# Patient Record
Sex: FEMALE | NOT HISPANIC OR LATINO | Employment: FULL TIME | URBAN - METROPOLITAN AREA
[De-identification: names, ages, dates, MRNs, and addresses within clinical notes are randomized per-mention and may not be internally consistent; named-entity substitution may affect disease eponyms.]

---

## 2017-05-10 ENCOUNTER — OFFICE VISIT - HEALTHEAST (OUTPATIENT)
Dept: FAMILY MEDICINE | Facility: CLINIC | Age: 42
End: 2017-05-10

## 2017-05-10 DIAGNOSIS — Z00.00 ROUTINE GENERAL MEDICAL EXAMINATION AT A HEALTH CARE FACILITY: ICD-10-CM

## 2017-05-10 DIAGNOSIS — Z30.011 ORAL CONTRACEPTION INITIATION: ICD-10-CM

## 2017-05-10 LAB
HIV 1+2 AB+HIV1 P24 AG SERPL QL IA: NEGATIVE
LDLC SERPL CALC-MCNC: 102 MG/DL

## 2017-05-10 ASSESSMENT — MIFFLIN-ST. JEOR: SCORE: 1343.06

## 2017-05-11 LAB
HBV SURFACE AG SERPL QL IA: NEGATIVE
SYPHILIS RPR SCREEN - HISTORICAL: NORMAL

## 2017-05-15 LAB
HPV INTERPRETATION - HISTORICAL: NORMAL
HPV INTERPRETER - HISTORICAL: NORMAL

## 2017-05-17 LAB
BKR LAB AP ABNORMAL BLEEDING: NO
BKR LAB AP BIRTH CONTROL/HORMONES: NORMAL
BKR LAB AP CERVICAL APPEARANCE: NORMAL
BKR LAB AP GYN ADEQUACY: NORMAL
BKR LAB AP GYN INTERPRETATION: NORMAL
BKR LAB AP HPV REFLEX: NORMAL
BKR LAB AP LMP: NORMAL
BKR LAB AP PATIENT STATUS: NORMAL
BKR LAB AP PREVIOUS ABNORMAL: 2010
BKR LAB AP PREVIOUS NORMAL: 2013
HIGH RISK?: NO
PATH REPORT.COMMENTS IMP SPEC: NORMAL
RESULT FLAG (HE HISTORICAL CONVERSION): NORMAL

## 2017-10-20 ENCOUNTER — COMMUNICATION - HEALTHEAST (OUTPATIENT)
Dept: FAMILY MEDICINE | Facility: CLINIC | Age: 42
End: 2017-10-20

## 2018-01-05 ENCOUNTER — RECORDS - HEALTHEAST (OUTPATIENT)
Dept: ADMINISTRATIVE | Facility: OTHER | Age: 43
End: 2018-01-05

## 2018-02-08 ENCOUNTER — COMMUNICATION - HEALTHEAST (OUTPATIENT)
Dept: FAMILY MEDICINE | Facility: CLINIC | Age: 43
End: 2018-02-08

## 2018-02-08 DIAGNOSIS — Z30.011 ORAL CONTRACEPTION INITIATION: ICD-10-CM

## 2018-05-02 ENCOUNTER — COMMUNICATION - HEALTHEAST (OUTPATIENT)
Dept: FAMILY MEDICINE | Facility: CLINIC | Age: 43
End: 2018-05-02

## 2018-05-02 DIAGNOSIS — Z30.011 ORAL CONTRACEPTION INITIATION: ICD-10-CM

## 2018-05-03 ENCOUNTER — COMMUNICATION - HEALTHEAST (OUTPATIENT)
Dept: FAMILY MEDICINE | Facility: CLINIC | Age: 43
End: 2018-05-03

## 2018-05-04 ENCOUNTER — OFFICE VISIT - HEALTHEAST (OUTPATIENT)
Dept: FAMILY MEDICINE | Facility: CLINIC | Age: 43
End: 2018-05-04

## 2018-05-04 DIAGNOSIS — H93.13 TINNITUS OF BOTH EARS: ICD-10-CM

## 2018-06-25 ENCOUNTER — OFFICE VISIT - HEALTHEAST (OUTPATIENT)
Dept: FAMILY MEDICINE | Facility: CLINIC | Age: 43
End: 2018-06-25

## 2018-06-25 DIAGNOSIS — H93.13 TINNITUS, BILATERAL: ICD-10-CM

## 2018-06-25 DIAGNOSIS — R42 VERTIGO: ICD-10-CM

## 2018-06-25 DIAGNOSIS — J30.2 SEASONAL ALLERGIC RHINITIS: ICD-10-CM

## 2018-06-25 DIAGNOSIS — I47.10 SVT (SUPRAVENTRICULAR TACHYCARDIA) (H): ICD-10-CM

## 2018-06-25 RX ORDER — CETIRIZINE HYDROCHLORIDE 10 MG/1
10 TABLET ORAL DAILY
Qty: 30 TABLET | Refills: 2 | Status: SHIPPED | OUTPATIENT
Start: 2018-06-25 | End: 2022-02-01

## 2018-06-25 RX ORDER — MECLIZINE HYDROCHLORIDE 25 MG/1
25 TABLET ORAL 3 TIMES DAILY PRN
Qty: 30 TABLET | Refills: 0 | Status: SHIPPED | OUTPATIENT
Start: 2018-06-25 | End: 2022-02-01

## 2018-06-26 ENCOUNTER — RECORDS - HEALTHEAST (OUTPATIENT)
Dept: ADMINISTRATIVE | Facility: OTHER | Age: 43
End: 2018-06-26

## 2018-07-24 ENCOUNTER — COMMUNICATION - HEALTHEAST (OUTPATIENT)
Dept: SCHEDULING | Facility: CLINIC | Age: 43
End: 2018-07-24

## 2018-07-25 ENCOUNTER — OFFICE VISIT - HEALTHEAST (OUTPATIENT)
Dept: FAMILY MEDICINE | Facility: CLINIC | Age: 43
End: 2018-07-25

## 2018-07-25 ENCOUNTER — RECORDS - HEALTHEAST (OUTPATIENT)
Dept: GENERAL RADIOLOGY | Facility: CLINIC | Age: 43
End: 2018-07-25

## 2018-07-25 DIAGNOSIS — M54.2 CERVICALGIA: ICD-10-CM
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
## 2018-08-06 ENCOUNTER — COMMUNICATION - HEALTHEAST (OUTPATIENT)
Dept: FAMILY MEDICINE | Facility: CLINIC | Age: 43
End: 2018-08-06

## 2018-08-06 DIAGNOSIS — Z30.011 ORAL CONTRACEPTION INITIATION: ICD-10-CM

## 2018-08-06 RX ORDER — NORGESTIMATE AND ETHINYL ESTRADIOL 7DAYSX3 28
1 KIT ORAL DAILY
Qty: 3 PACKAGE | Refills: 3 | Status: SHIPPED | OUTPATIENT
Start: 2018-08-06 | End: 2022-01-03

## 2021-05-30 VITALS — WEIGHT: 146.7 LBS | HEIGHT: 67 IN | BODY MASS INDEX: 23.02 KG/M2

## 2021-06-01 VITALS — BODY MASS INDEX: 22.4 KG/M2 | WEIGHT: 143 LBS

## 2021-06-01 VITALS — WEIGHT: 144.7 LBS | BODY MASS INDEX: 22.66 KG/M2

## 2021-06-01 VITALS — BODY MASS INDEX: 23.09 KG/M2 | WEIGHT: 147.4 LBS

## 2021-06-03 ENCOUNTER — RECORDS - HEALTHEAST (OUTPATIENT)
Dept: ADMINISTRATIVE | Facility: CLINIC | Age: 46
End: 2021-06-03

## 2021-06-10 NOTE — PROGRESS NOTES
Assessment:     1. Routine general medical examination at a health care facility  - LDL Cholesterol, Direct  - Comprehensive Metabolic Panel  - Gynecologic Cytology (PAP Smear)  - HIV Antigen/Antibody Screening Cascade  - Chlamydia trachomatis & Neisseria gonorrhoeae, Amplified Detection  - Hepatitis B Surface Antigen (HBsAG)  - Syphilis Screen, Cascade    2. Oral contraception initiation  - Pregnancy, Urine  - norgestimate-ethinyl estradiol (TRI-SPRINTEC, 28,) 0.18/0.215/0.25 mg-35 mcg (28) Tab tablet; Take 1 tablet by mouth daily.  Dispense: 3 Package; Refill: 2       Plan:       All questions answered.  Await pap smear results.  Blood tests: Comprehensive metabolic panel, Lipoproteins, Total cholesterol and STD screening.  Chlamydia specimen.  Contraception: OCP (estrogen/progesterone).  Discussed healthy lifestyle modifications.  Follow up as needed.   Full counseling done with regards to complications and side effects of OCPs.  Also discussed sexual health and STD screening.  Her questions were answered.  We also discussed the Pap smear as well as HPV.  She has not done any mammograms in the past and I did encourage her to get one.  She was given a flyer to call and make an appointment for the mammogram.  Subjective:      Rola Velasquez is a 42 y.o. female who presents for an annual exam. The patient is sexually active. The patient participates in regular exercise: yes. The patient reports that there is not domestic violence in her life. Comes for a full female physical as well as to discuss contraception.Has been using condom in the past and that been good for her.She also wants to have STD screening done.  Her last Pap was in 2013 though she thinks she has had it done a lot sooner.    Healthy Habits:   Regular Exercise: Yes  Sunscreen Use: Yes and N/A  Healthy Diet: Yes  Dental Visits Regularly: Yes  Seat Belt: Yes  Sexually active: Yes  Self Breast Exam Monthly:Yes  Hemoccults: N/A  Flex Sig:  N/A  Colonoscopy: N/A  Lipid Profile: Yes  Glucose Screen: Yes  Prevention of Osteoporosis: Yes  Last Dexa: N/A  Guns at Home:  N/A      Immunization History   Administered Date(s) Administered     Influenza, Seasonal, Inj PF 10/14/2013     Tdap 03/04/2013     Immunization status: up to date and documented.    No exam data present    Gynecologic History  Patient's last menstrual period was 04/23/2017 (exact date).  Contraception: condoms  Last Pap: 2013. Results were: normal  Last mammogram: NONE YET.     OB History   No data available       Current Outpatient Prescriptions   Medication Sig Dispense Refill     CALCIUM CARBONATE/VITAMIN D3 (CALCIUM+D ORAL)        CYANOCOBALAMIN, VITAMIN B-12, (VITAMIN B12 ORAL) Take by mouth.       protein Powd Take by mouth.       No current facility-administered medications for this visit.      No past medical history on file.  No past surgical history on file.  Azithromycin  No family history on file.  Social History     Social History     Marital status:      Spouse name: N/A     Number of children: N/A     Years of education: N/A     Occupational History     Not on file.     Social History Main Topics     Smoking status: Never Smoker     Smokeless tobacco: Not on file     Alcohol use Not on file     Drug use: Not on file     Sexual activity: Not on file     Other Topics Concern     Not on file     Social History Narrative       Review of Systems  General:  Denies problem  Eyes: Denies problem  Ears/Nose/Throat: Denies problem  Cardiovascular: Denies problem,though has some high heart rate intermittently.  Respiratory:  Denies problem  Gastrointestinal:  Denies problem, Genitourinary: Denies problem  Musculoskeletal:  Denies problem  Skin: Denies problem  Neurologic: Denies problem  Psychiatric: Denies problem  Endocrine: Denies problem  Heme/Lymphatic: Denies problem   Allergic/Immunologic: Denies problem        Objective:         Vitals:    05/10/17 0857   BP: 100/64  "  Pulse: 88   Weight: 146 lb 11.2 oz (66.5 kg)   Height: 5' 7\" (1.702 m)     Body mass index is 22.98 kg/(m^2).    Physical Exam:  General Appearance: Alert, cooperative, no distress, appears stated age  Head: Normocephalic, without obvious abnormality, atraumatic  Eyes: PERRL, conjunctiva/corneas clear, EOM's intact  Ears: Normal TM's and external ear canals, both ears  Nose: Nares normal, septum midline,mucosa normal, no drainage  Throat: Lips, mucosa, and tongue normal; teeth and gums normal  Neck: Supple, symmetrical, trachea midline, no adenopathy;  thyroid: not enlarged, symmetric.  Back: Symmetric, no curvature, ROM normal, no CVA tenderness  Lungs: Clear to auscultation bilaterally, respirations unlabored  Heart: Regular rate and rhythm, S1 and S2 normal, no murmur, rub, or gallop,  Abdomen: Soft, non-tender, bowel sounds active all four quadrants,  no masses, no organomegaly  Pelvic:Normally developed genitalia with no external lesions or eruptions. Vagina show no lesions, inflammation, discharge or tenderness.Cervix with normal os,and hypogpimented lesions ?cervical cysts.   Uterus :normal adnexal mass or tenderness.  Extremities: Extremities normal, atraumatic, no cyanosis or edema  Skin: Skin color, texture, turgor normal, no rashes or lesions  Lymph nodes: Cervical, supraclavicular, and axillary nodes normal  Neurologic: Normal        "

## 2021-06-16 PROBLEM — I47.10 SVT (SUPRAVENTRICULAR TACHYCARDIA) (H): Status: ACTIVE | Noted: 2018-06-25

## 2021-06-17 NOTE — PROGRESS NOTES
Assessment/Plan:      1. Tinnitus of both ears  She has had Tinnitus before now.There is no signs of infection and she was reassured. She will inform us if there is any worsening or pain to the ears.Questions were answered.      Subjective:    Patient ID: Rola Velasquez is a 43 y.o. female.    HPI Comments: Rola Velasquez 43-year-old female who comes in today with concerns of having tinnitus which is a bilateral.  Noted that this has been going on for the past 3 weeks.  She did have an episode of tinnitus in the past noting that that was associated with infection though she did not have any ear pain.  She wants to come in to have it checked out in order to make sure that she does not have any infection.  She does have mild runny nose, no cough no chest pain or shortness of breath.      The following portions of the patient's history were reviewed and updated as appropriate: allergies, current medications, past family history, past medical history, past social history, past surgical history and problem list.    Review of Systems   Constitutional: Negative.    HENT: Negative.  Negative for hearing loss, sinus pain, sinus pressure and sore throat.    Respiratory: Negative for cough, chest tightness and wheezing.    Cardiovascular: Negative.    Musculoskeletal: Negative for neck pain and neck stiffness.   Neurological: Negative for dizziness, light-headedness and headaches.     Vitals:    05/04/18 1638   BP: 110/66   Patient Site: Left Arm   Patient Position: Sitting   Cuff Size: Adult Regular   Pulse: 88   Temp: 98  F (36.7  C)   TempSrc: Oral   Weight: 143 lb (64.9 kg)             Objective:    Physical Exam   Constitutional: She appears well-developed and well-nourished.   HENT:   Right Ear: A middle ear effusion is present.   Left Ear: A middle ear effusion is present.   Nose: Rhinorrhea present.   Mouth/Throat: Oropharynx is clear and moist.   Very minimal ear effusion. Minimal rhinorrhea.    Neck: Normal range of motion.   Cardiovascular: Normal rate and regular rhythm.    Pulmonary/Chest: Effort normal and breath sounds normal.

## 2021-06-18 NOTE — PROGRESS NOTES
"Subjective:      Patient ID: Rola Velasquez is a 43 y.o. female.    Chief Complaint:   Chief Complaint   Patient presents with     Ear Pain     Sx. started in 1/2018, poss sinus infection, vertigo, ring in ear's         HPI Tinnitus since January, high-pitched, \"loud\", briefly will go away only very occasionally, constant, non-pulsatile when present.  No alleviating factors. Saw ENT in January who said she had mild fluid behind the ears with nml audiology testing - see note under media tab.  Thought poss related to jaw clenching.  Patient wears a mouthguard at night.  Can also hear heartbeat in ears starting this weekend.    Vertigo: Room-spinning starting 2 days ago.  Had similar episode in January/Feb, only previous episode was worse.  Worse with turning head side to side both directions.  No vomiting.  Hasn't tried anything.  Not having symptoms at this moment.  Laid down to go bed last night and room started spinning.  Last x 1 min.      Allergies: Has allergies to mold, dust and \"everything airborne.\"  Takes Benadryl; improved following allergy injections, but still takes Benadryl frequently which causes sleepiness.    Avoid Sudafed due to SVT.    Ear:  Feels \"stuffy.\" Uncomfortable. Gets frequent ear infections.      Sinuses: No facial/head pain.  Used Neti pot this morning with some relief.  States sinuses are \"always\" this way. No purulent drainage. Wonders if sinuses are infected and causing sx.      Had a ENT appointment scheduled for tomorrow at [facility], but appointment had to be canceled because provider scheduled surgery.  Works as an .    No past medical history on file.    No past surgical history on file.    No family history on file.    Social History   Substance Use Topics     Smoking status: Never Smoker     Smokeless tobacco: Never Used     Alcohol use None       Review of Systems   Constitutional: Negative for chills and fatigue.   HENT: Positive for congestion, dental " "problem (teeth clenching), postnasal drip, rhinorrhea and tinnitus. Negative for ear discharge, ear pain (Feels \"uncomfortable.\" ), facial swelling, sinus pain, sinus pressure, sore throat and voice change.    Eyes: Negative for discharge, redness, itching and visual disturbance.   Respiratory: Negative for cough and shortness of breath.    Cardiovascular: Negative for chest pain and palpitations.   Endocrine:        Excessive hair growth. \"I think I have a hormonal problem.\"    Musculoskeletal: Negative for myalgias.   Skin: Negative for color change.       Objective:     /70 (Patient Site: Right Arm, Patient Position: Sitting, Cuff Size: Adult Regular)  Pulse 99  Temp 98.1  F (36.7  C) (Oral)   Resp 18  Wt 144 lb 11.2 oz (65.6 kg)  LMP 06/25/2018  SpO2 98%  BMI 22.66 kg/m2    Physical Exam   Constitutional: She appears well-developed and well-nourished.   HENT:   Right Ear: External ear normal. Tympanic membrane is not perforated, not erythematous, not retracted and not bulging. No middle ear effusion.   Left Ear: External ear normal. Tympanic membrane is not perforated, not erythematous, not retracted and not bulging.  No middle ear effusion.   Nose: No rhinorrhea or sinus tenderness. Right sinus exhibits no maxillary sinus tenderness and no frontal sinus tenderness. Left sinus exhibits no maxillary sinus tenderness and no frontal sinus tenderness.   Mouth/Throat: Uvula is midline. Posterior oropharyngeal erythema present. No oropharyngeal exudate or tonsillar abscesses.   Normal locations of cone of light   Eyes: Pupils are equal, round, and reactive to light. Right eye exhibits no discharge. Left eye exhibits no discharge. Right eye exhibits normal extraocular motion and no nystagmus. Left eye exhibits normal extraocular motion and no nystagmus.   Cardiovascular: Normal rate, regular rhythm, S1 normal and S2 normal.    No murmur heard.  Neurological: She is alert. She has normal strength. No " cranial nerve deficit or sensory deficit. She displays a negative Romberg sign.       Assessment:     Procedures    The primary encounter diagnosis was Vertigo. Diagnoses of Seasonal allergic rhinitis, Tinnitus, bilateral, and SVT (supraventricular tachycardia) (H) were also pertinent to this visit.    Plan:     Diagnoses and all orders for this visit:    Vertigo  -     meclizine (ANTIVERT) 25 mg tablet; Take 1 tablet (25 mg total) by mouth 3 (three) times a day as needed for dizziness or nausea.  Dispense: 30 tablet; Refill: 0    Seasonal allergic rhinitis  -     cetirizine (ZYRTEC) 10 MG tablet; Take 1 tablet (10 mg total) by mouth daily.  Dispense: 30 tablet; Refill: 2    Tinnitus, bilateral    SVT (supraventricular tachycardia) (H)    Given contact information for Dr. Dheeraj Ely who saw her in January.  Exercise caution while driving.  Zyrtec once a day.  Informed may make her a little sleepy.  Stop Benadryl if taking Zyrtec.      Suspect patient could have Ménière's disease given vertigo, tinnitus, and feeling of fluid behind her ears.  Patient informed that specialist would diagnose this condition if she had it.  Patient could also need an MRI at this point due to the ongoing nature of symptoms for 6 months.  Neuro exam today was completely negative, including a negative Romberg.      No indication for antibiotics for sinuses nor ears at this time.
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
## 2021-08-15 ENCOUNTER — HEALTH MAINTENANCE LETTER (OUTPATIENT)
Age: 46
End: 2021-08-15

## 2021-10-11 ENCOUNTER — HEALTH MAINTENANCE LETTER (OUTPATIENT)
Age: 46
End: 2021-10-11

## 2021-11-17 ENCOUNTER — HOSPITAL ENCOUNTER (OUTPATIENT)
Facility: CLINIC | Age: 46
End: 2021-11-17
Attending: PLASTIC SURGERY | Admitting: PLASTIC SURGERY
Payer: COMMERCIAL

## 2021-11-17 DIAGNOSIS — Z11.59 ENCOUNTER FOR SCREENING FOR OTHER VIRAL DISEASES: ICD-10-CM

## 2021-12-23 NOTE — PROGRESS NOTES
Assessment & Plan     Adverse effect of COVID-19 vaccine  Patient is new to me.  Patient is 42-year-old female with history of SVT, tinnitus and multiple allergies presented to the clinic for COVID-19 vaccination counseling.  Patient states she received 2 shots of COVID-19 on 03/05/2021 and 03/26/2021 in [place].  The vaccine was given in vaccine site in the parking lot not in hospital setting.  Patient states that she had reaction from the vaccine.  She reports facial numbness (likely left side) started 10 to 15 minutes after the shot, she also had difficulty swallowing.  Patient also reports leg numbness.  Patient was monitored at the vaccine site for additional 30 minutes, no EpiPen was given and subsequently she was discharged home.  Because of this experience she states she needs to be monitored during the vaccine administration.  Patient is not sure if this reaction occurred after the first of the second shot.  No current symptoms today.  Discussed with patient her concern.  It is unclear what kind of reaction she had.  Based on the information provided the patient, I recommended obtaining COVID-19 antibodies and referral to allergy specialist for clearance to give the booster shot of COVID-19.  In the meantime advised the patient to exercise precaution to avoid COVID-19 infection.  - Adult Allergy/Asthma Referral; Future  - COVID-19 dEuardo RBD Kasie & Titer Reflex; Future  - SARS-CoV-2 Nucleocapsid Total Ab; Future  - COVID-19 Eduardo RBD Kasie & Titer Reflex  - SARS-CoV-2 Nucleocapsid Total Ab      Patient verbalized understanding and agreed on the plan of care. All questions answered.                  Return in about 4 weeks (around 1/21/2022), or if symptoms worsen or fail to improve, for Follow up.    Charles Matson MD  [facility]   Rola is a 46 year old who presents for the following health issues     HPI     -Discuss COVID Vaccine       Review of Systems  "  Constitutional, HEENT, cardiovascular, pulmonary, gi and gu systems are negative, except as otherwise noted.      Objective    /83 (BP Location: Left arm, Patient Position: Sitting, Cuff Size: Adult Regular)   Pulse 88   Temp (!) 96.7  F (35.9  C) (Tympanic)   Ht 1.702 m (5' 7\")   Wt 62.6 kg (138 lb)   SpO2 97%   BMI 21.61 kg/m    Body mass index is 21.61 kg/m .  Physical Exam  Vitals and nursing note reviewed.   Constitutional:       General: She is not in acute distress.     Appearance: Normal appearance. She is not ill-appearing, toxic-appearing or diaphoretic.   Neurological:      Mental Status: She is alert.                        "

## 2021-12-24 ENCOUNTER — OFFICE VISIT (OUTPATIENT)
Dept: FAMILY MEDICINE | Facility: CLINIC | Age: 46
End: 2021-12-24
Payer: COMMERCIAL

## 2021-12-24 VITALS
SYSTOLIC BLOOD PRESSURE: 124 MMHG | HEIGHT: 67 IN | OXYGEN SATURATION: 97 % | HEART RATE: 88 BPM | WEIGHT: 138 LBS | DIASTOLIC BLOOD PRESSURE: 83 MMHG | BODY MASS INDEX: 21.66 KG/M2 | TEMPERATURE: 96.7 F

## 2021-12-24 DIAGNOSIS — T50.B95A ADVERSE EFFECT OF COVID-19 VACCINE: Primary | ICD-10-CM

## 2021-12-24 PROCEDURE — 36415 COLL VENOUS BLD VENIPUNCTURE: CPT | Performed by: FAMILY MEDICINE

## 2021-12-24 PROCEDURE — 99000 SPECIMEN HANDLING OFFICE-LAB: CPT | Performed by: FAMILY MEDICINE

## 2021-12-24 PROCEDURE — 86769 SARS-COV-2 COVID-19 ANTIBODY: CPT | Mod: 90 | Performed by: FAMILY MEDICINE

## 2021-12-24 PROCEDURE — 99203 OFFICE O/P NEW LOW 30 MIN: CPT | Performed by: FAMILY MEDICINE

## 2021-12-24 ASSESSMENT — MIFFLIN-ST. JEOR: SCORE: 1303.59

## 2021-12-24 ASSESSMENT — PAIN SCALES - GENERAL: PAINLEVEL: NO PAIN (0)

## 2021-12-26 LAB — SARS-COV-2 AB SERPL QL IA: NEGATIVE

## 2021-12-27 LAB
SARS-COV-2 AB SERPL IA-ACNC: 221 U/ML
SARS-COV-2 AB SERPL QL IA: POSITIVE

## 2022-01-03 ENCOUNTER — OFFICE VISIT (OUTPATIENT)
Dept: FAMILY MEDICINE | Facility: CLINIC | Age: 47
End: 2022-01-03
Payer: COMMERCIAL

## 2022-01-03 VITALS
BODY MASS INDEX: 22.63 KG/M2 | WEIGHT: 144.2 LBS | TEMPERATURE: 98.2 F | HEIGHT: 67 IN | DIASTOLIC BLOOD PRESSURE: 70 MMHG | OXYGEN SATURATION: 99 % | HEART RATE: 91 BPM | SYSTOLIC BLOOD PRESSURE: 106 MMHG

## 2022-01-03 DIAGNOSIS — Z11.3 SCREEN FOR STD (SEXUALLY TRANSMITTED DISEASE): ICD-10-CM

## 2022-01-03 DIAGNOSIS — Z30.011 OCP (ORAL CONTRACEPTIVE PILLS) INITIATION: Primary | ICD-10-CM

## 2022-01-03 DIAGNOSIS — Z30.011 ORAL CONTRACEPTION INITIATION: ICD-10-CM

## 2022-01-03 LAB
B-HCG SERPL-ACNC: <1 IU/L (ref 0–5)
HCG UR QL: NEGATIVE

## 2022-01-03 PROCEDURE — 81025 URINE PREGNANCY TEST: CPT | Performed by: FAMILY MEDICINE

## 2022-01-03 PROCEDURE — 86780 TREPONEMA PALLIDUM: CPT | Performed by: FAMILY MEDICINE

## 2022-01-03 PROCEDURE — 87591 N.GONORRHOEAE DNA AMP PROB: CPT | Performed by: FAMILY MEDICINE

## 2022-01-03 PROCEDURE — 36415 COLL VENOUS BLD VENIPUNCTURE: CPT | Performed by: FAMILY MEDICINE

## 2022-01-03 PROCEDURE — 99213 OFFICE O/P EST LOW 20 MIN: CPT | Performed by: FAMILY MEDICINE

## 2022-01-03 PROCEDURE — 87491 CHLMYD TRACH DNA AMP PROBE: CPT | Performed by: FAMILY MEDICINE

## 2022-01-03 PROCEDURE — 87389 HIV-1 AG W/HIV-1&-2 AB AG IA: CPT | Performed by: FAMILY MEDICINE

## 2022-01-03 PROCEDURE — 84702 CHORIONIC GONADOTROPIN TEST: CPT | Performed by: FAMILY MEDICINE

## 2022-01-03 RX ORDER — NORGESTIMATE AND ETHINYL ESTRADIOL 7DAYSX3 28
1 KIT ORAL DAILY
Qty: 180 TABLET | Refills: 1 | Status: SHIPPED | OUTPATIENT
Start: 2022-01-03 | End: 2023-09-06

## 2022-01-03 ASSESSMENT — MIFFLIN-ST. JEOR: SCORE: 1331.72

## 2022-01-03 NOTE — PATIENT INSTRUCTIONS
Birth control pills are a medication you take every day to prevent pregnancy. If birth control pills are always used correctly, less than 1 out of 100 women using them will get pregnant each year. When you first start the pill, it takes several days to begin working. Be sure to use backup birth control (like a condom) for the first 7 days preferably till the first packet is completed.  The hormones in the pill keep your ovaries from releasing eggs and thicken your cervical mucus to block sperm from getting into the uterus.  Most women can get pregnant quickly when they stop using the pill.  Your periods may become lighter and less painful if you take the pill.  The hormones in pills offer health benefits. The pill can offer some protection against acne, non-cancerous breast growths, ectopic pregnancy, endometrial and ovarian cancers, iron deficiency anemia, and ovarian cysts.  Birth control pills do not protect against sexually transmitted infections (STIs). Some women may have side effects while using birth control pills. They include bleeding between periods, breast tenderness, and nausea. Some of the most common side effects only last for the first few months.   Risks discussed including risk for heart attack, stroke and blood clots.  Patient is not a smoker and has no personal or family history of blood clots/bleeding disorders.  Regular condom use is recommended to help protect against STIs.

## 2022-01-04 LAB
C TRACH DNA SPEC QL NAA+PROBE: NEGATIVE
HIV 1+2 AB+HIV1 P24 AG SERPL QL IA: NONREACTIVE
N GONORRHOEA DNA SPEC QL NAA+PROBE: NEGATIVE
T PALLIDUM AB SER QL: NONREACTIVE

## 2022-01-21 ENCOUNTER — LAB (OUTPATIENT)
Dept: LAB | Facility: CLINIC | Age: 47
End: 2022-01-21
Payer: COMMERCIAL

## 2022-01-21 ENCOUNTER — OFFICE VISIT (OUTPATIENT)
Dept: NEUROLOGY | Facility: CLINIC | Age: 47
End: 2022-01-21
Payer: COMMERCIAL

## 2022-01-21 VITALS
SYSTOLIC BLOOD PRESSURE: 118 MMHG | DIASTOLIC BLOOD PRESSURE: 74 MMHG | HEART RATE: 68 BPM | WEIGHT: 144 LBS | BODY MASS INDEX: 22.6 KG/M2 | HEIGHT: 67 IN

## 2022-01-21 DIAGNOSIS — R29.898 WEAKNESS OF LEFT LOWER EXTREMITY: ICD-10-CM

## 2022-01-21 DIAGNOSIS — M54.2 CERVICALGIA: Primary | ICD-10-CM

## 2022-01-21 DIAGNOSIS — M54.2 CERVICALGIA: ICD-10-CM

## 2022-01-21 DIAGNOSIS — T50.B95A ADVERSE EFFECT OF COVID-19 VACCINE: ICD-10-CM

## 2022-01-21 DIAGNOSIS — R20.0 FACIAL NUMBNESS: ICD-10-CM

## 2022-01-21 LAB — HBA1C MFR BLD: 5 % (ref 0–5.6)

## 2022-01-21 PROCEDURE — 83519 RIA NONANTIBODY: CPT | Mod: 90

## 2022-01-21 PROCEDURE — 83516 IMMUNOASSAY NONANTIBODY: CPT | Mod: 90

## 2022-01-21 PROCEDURE — 84439 ASSAY OF FREE THYROXINE: CPT

## 2022-01-21 PROCEDURE — 99000 SPECIMEN HANDLING OFFICE-LAB: CPT

## 2022-01-21 PROCEDURE — 82390 ASSAY OF CERULOPLASMIN: CPT

## 2022-01-21 PROCEDURE — 83036 HEMOGLOBIN GLYCOSYLATED A1C: CPT

## 2022-01-21 PROCEDURE — 86038 ANTINUCLEAR ANTIBODIES: CPT

## 2022-01-21 PROCEDURE — 86256 FLUORESCENT ANTIBODY TITER: CPT

## 2022-01-21 PROCEDURE — 82607 VITAMIN B-12: CPT

## 2022-01-21 PROCEDURE — 84443 ASSAY THYROID STIM HORMONE: CPT

## 2022-01-21 PROCEDURE — 99205 OFFICE O/P NEW HI 60 MIN: CPT | Performed by: PSYCHIATRY & NEUROLOGY

## 2022-01-21 PROCEDURE — 82525 ASSAY OF COPPER: CPT | Mod: 90

## 2022-01-21 PROCEDURE — 80076 HEPATIC FUNCTION PANEL: CPT

## 2022-01-21 PROCEDURE — 82164 ANGIOTENSIN I ENZYME TEST: CPT | Mod: 90

## 2022-01-21 PROCEDURE — 86039 ANTINUCLEAR ANTIBODIES (ANA): CPT

## 2022-01-21 PROCEDURE — 86255 FLUORESCENT ANTIBODY SCREEN: CPT | Mod: 90

## 2022-01-21 PROCEDURE — 36415 COLL VENOUS BLD VENIPUNCTURE: CPT

## 2022-01-21 PROCEDURE — 86036 ANCA SCREEN EACH ANTIBODY: CPT

## 2022-01-21 PROCEDURE — 82550 ASSAY OF CK (CPK): CPT

## 2022-01-21 ASSESSMENT — MIFFLIN-ST. JEOR: SCORE: 1330.81

## 2022-01-21 NOTE — NURSING NOTE
Chief Complaint   Patient presents with     New Patient     facial weakness/ BLE weakness     Cristy Zuñiga MA on 1/21/2022 at 8:19 AM
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
## 2022-01-22 LAB — VIT B12 SERPL-MCNC: 638 PG/ML (ref 193–986)

## 2022-01-23 LAB — ACHR BIND AB SER-SCNC: 0 NMOL/L

## 2022-01-24 LAB
ACE SERPL-CCNC: 35 U/L
ACHR BLOCK AB/ACHR TOTAL SFR SER: 19 %
ACHR MOD AB/ACHR TOTAL SFR SER: 0 %
ALBUMIN SERPL-MCNC: 3.4 G/DL (ref 3.4–5)
ALP SERPL-CCNC: 54 U/L (ref 40–150)
ALT SERPL W P-5'-P-CCNC: 47 U/L (ref 0–50)
ANA PAT SER IF-IMP: ABNORMAL
ANA SER QL IF: POSITIVE
ANA TITR SER IF: ABNORMAL {TITER}
ANCA AB PATTERN SER IF-IMP: NORMAL
AST SERPL W P-5'-P-CCNC: 19 U/L (ref 0–45)
BILIRUB DIRECT SERPL-MCNC: <0.1 MG/DL (ref 0–0.2)
BILIRUB SERPL-MCNC: 0.2 MG/DL (ref 0.2–1.3)
C-ANCA TITR SER IF: NORMAL {TITER}
CERULOPLASMIN SERPL-MCNC: 35 MG/DL (ref 20–60)
CK SERPL-CCNC: 57 U/L (ref 30–225)
PROT SERPL-MCNC: 6.8 G/DL (ref 6.8–8.8)
STRIA MUS IGG SER QL IF: NORMAL
T4 FREE SERPL-MCNC: 0.87 NG/DL (ref 0.76–1.46)
TSH SERPL DL<=0.005 MIU/L-ACNC: 5.62 MU/L (ref 0.4–4)

## 2022-01-25 LAB — COPPER SERPL-MCNC: 134.5 UG/DL

## 2022-02-01 ENCOUNTER — OFFICE VISIT (OUTPATIENT)
Dept: CARDIOLOGY | Facility: CLINIC | Age: 47
End: 2022-02-01
Payer: COMMERCIAL

## 2022-02-01 VITALS
BODY MASS INDEX: 22.24 KG/M2 | WEIGHT: 142 LBS | DIASTOLIC BLOOD PRESSURE: 66 MMHG | SYSTOLIC BLOOD PRESSURE: 108 MMHG | OXYGEN SATURATION: 100 % | HEART RATE: 66 BPM | RESPIRATION RATE: 16 BRPM

## 2022-02-01 DIAGNOSIS — I47.10 SVT (SUPRAVENTRICULAR TACHYCARDIA) (H): Primary | ICD-10-CM

## 2022-02-01 DIAGNOSIS — R00.2 PALPITATIONS: ICD-10-CM

## 2022-02-01 PROCEDURE — 99204 OFFICE O/P NEW MOD 45 MIN: CPT | Performed by: INTERNAL MEDICINE

## 2022-02-01 NOTE — LETTER
Date:February 28, 2022      Provider requested that no letter be sent. Do not send.       [facility]

## 2022-02-01 NOTE — LETTER
2/1/2022    Physician No Ref-Primary  No address on file    RE: Rola Miller       Dear Colleague,     I had the pleasure of seeing Rola Miller in the [facility].      [facility]  [phone]          Assessment/Recommendations   Patient with known history of palpitations and what sounds like supraventricular tachycardia about 10 years ago with 2 episodes.  She was offered ablation at that time but did not pursue it.  She currently has episodes, the worst of which are 5 minutes in duration and not associate with syncopal or near syncopal episodes which is comforting.    We decided to check a monitor to see if we can capture some of her symptoms and identify if it is a supraventricular tachycardia, PACs or PVCs or even atrial fibrillation although I am less suspicious of that.  She is agreeable.  We will do an echocardiogram to ensure that her heart is structurally normal.    These 2 tests are unremarkable, I would reassure her and likely see her back in a couple of years to see how she is doing but of course would be happy to see her sooner if she has worsening symptoms or questions.  She is agreeable to this approach.    We will get a twelve-lead EKG when she comes in for her Holter monitor.    TSH was mildly elevated so she certainly does not have hyperthyroidism.      45 minutes spent with chart review, patient visit, discussion of SVT, and documentation.       History of Present Illness/Subjective    Ms. Rola Miller is a 46 year old female with known history of SVT with a couple of episodes 10 years ago.  She had one episode where she had more persistent of racing heartbeat and then shortly thereafter about 10 years ago after taking some Sudafed she had to go to the hospital where it sounds like she had persistent SVT although it likely broke while she is in the emergency room and I do not have those records.  The patient that she  was at [facility] but the records back from emergency department do not have any progress notes.    More recently she does get feeling of palpitations which she describes as a sinking feeling and then samantha chunk feeling in her chest.  Typically they come and go very quickly and when she has a particularly bad episode and can be sporadic for 5 minutes and then go away.  It is not associated with syncope, or near syncope.  She does not get shortness of breath and gets an unusual feeling in her chest but not specifically chest pain.    She feels like that these episodes are about once a month or so or in much more minor episodes are a bit more frequent.    She is physically active and does a exercise routine every morning for about 15 minutes to exercise multiple muscle systems in her body.  On the the weekend she generally hikes for 2 to 5 hours without limitations.  She denies any of these palpitations symptoms with physical activity but they occur more with rest.  Chart review does indicated history of ADHD which is not taking any medications currently that would be considered stimulants.    She is not hypertensive, reports that her cholesterol has been okay in the past and her father has hypertension and some type of heart problem.  She has never smoked cigarettes and she is not diabetic.    She grew up in [place].  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  She has 2 grown sons.  She is currently not .         Physical Examination Review of Systems   /66 (BP Location: Left arm, Patient Position: Sitting, Cuff Size: Adult Regular)   Pulse 66   Resp 16   Wt 64.4 kg (142 lb)   SpO2 100%   BMI 22.24 kg/m    Body mass index is 22.24 kg/m .  Wt Readings from Last 3 Encounters:   02/01/22 64.4 kg (142 lb)   01/21/22 65.3 kg (144 lb)   01/03/22 65.4 kg (144 lb 3.2 oz)     General Appearance:   Alert, cooperative and in no acute distress.    ENT/Mouth: Patient wearing a mask.      EYES:  no scleral icterus, normal conjunctivae   Neck: JVP normal. No Hepatojugular reflux. Thyroid not visualized.   Chest/Lungs:   Lungs are clear to auscultation, equal chest wall expansion.   Cardiovascular:   S1, S2 without murmur ,clicks or rubs. Brachial, radial and posterior tibial pulses are intact and symetric. No carotid bruits noted   Abdomen:  Nontender. BS+. No bruits.   Extremities: No cyanosis, clubbing or edema   Skin: no xanthelasma, warm.    Neurologic: normal arm movement bilateral, no tremors     Psychiatric: Appropriate affect.      Enc Vitals  BP: 108/66  Pulse: 66  Resp: 16  SpO2: 100 %  Weight: 64.4 kg (142 lb)                                           Medical History  Surgical History Family History Social History   Past Medical History:   Diagnosis Date     SVT (supraventricular tachycardia) (H)      Tinnitus, bilateral     No past surgical history on file. Family History   Problem Relation Age of Onset     Thyroid Disease Mother      Diabetes Father      Coronary Artery Disease Father      Bladder Cancer Father     Social History     Socioeconomic History     Marital status:      Spouse name: Not on file     Number of children: Not on file     Years of education: Not on file     Highest education level: Not on file   Occupational History     Not on file   Tobacco Use     Smoking status: Never Smoker     Smokeless tobacco: Never Used   Substance and Sexual Activity     Alcohol use: Not on file     Drug use: Not on file     Sexual activity: Not on file   Other Topics Concern     Not on file   Social History Narrative     Not on file     Social Determinants of Health     Financial Resource Strain: Not on file   Food Insecurity: Not on file   Transportation Needs: Not on file   Physical Activity: Not on file   Stress: Not on file   Social Connections: Not on file   Intimate Partner Violence: Not on file   Housing Stability: Not on file           Medications  Allergies   Current Outpatient Medications   Medication Sig Dispense Refill     norgestim-eth estrad triphasic (ORTHO TRI-CYCLEN) 0.18/0.215/0.25 MG-35 MCG tablet Take 1 tablet by mouth daily 180 tablet 1     CALCIUM CARBONATE/VITAMIN D3 (CALCIUM+D ORAL) [CALCIUM CARBONATE/VITAMIN D3 (CALCIUM+D ORAL)]  (Patient not taking: [CALCIUM CARBONATE/VITAMIN D3 (CALCIUM+D ORAL)] )       cetirizine (ZYRTEC) 10 MG tablet [CETIRIZINE (ZYRTEC) 10 MG TABLET] Take 1 tablet (10 mg total) by mouth daily. (Patient not taking: Reported on 1/3/2022) 30 tablet 2     CYANOCOBALAMIN, VITAMIN B-12, (VITAMIN B12 ORAL) [CYANOCOBALAMIN, VITAMIN B-12, (VITAMIN B12 ORAL)] Take by mouth. (Patient not taking: Reported on 1/3/2022)       meclizine (ANTIVERT) 25 mg tablet [MECLIZINE (ANTIVERT) 25 MG TABLET] Take 1 tablet (25 mg total) by mouth 3 (three) times a day as needed for dizziness or nausea. (Patient not taking: Reported on 1/3/2022) 30 tablet 0     protein Powd [PROTEIN POWD] Take by mouth. (Patient not taking: Reported on 1/21/2022)      Allergies   Allergen Reactions     Azithromycin Unknown         Lab Results    Chemistry/lipid CBC Cardiac Enzymes/BNP/TSH/INR   Lab Results   Component Value Date    CHOL 191 03/05/2013    HDL 63 03/05/2013    TRIG 85 03/05/2013    No results found for: WBC, HGB, HCT, MCV, PLT Lab Results   Component Value Date    TSH 5.62 (H) 01/21/2022                                               Thank you for allowing me to participate in the care of your patient.      Sincerely,     Rancho Obrien MD     [facility]  cc:   No referring provider defined for this encounter.

## 2022-02-01 NOTE — PROGRESS NOTES
[facility]  [phone]          Assessment/Recommendations   Patient with known history of palpitations and what sounds like supraventricular tachycardia about 10 years ago with 2 episodes.  She was offered ablation at that time but did not pursue it.  She currently has episodes, the worst of which are 5 minutes in duration and not associate with syncopal or near syncopal episodes which is comforting.    We decided to check a monitor to see if we can capture some of her symptoms and identify if it is a supraventricular tachycardia, PACs or PVCs or even atrial fibrillation although I am less suspicious of that.  She is agreeable.  We will do an echocardiogram to ensure that her heart is structurally normal.    These 2 tests are unremarkable, I would reassure her and likely see her back in a couple of years to see how she is doing but of course would be happy to see her sooner if she has worsening symptoms or questions.  She is agreeable to this approach.    We will get a twelve-lead EKG when she comes in for her Holter monitor.    TSH was mildly elevated so she certainly does not have hyperthyroidism.      45 minutes spent with chart review, patient visit, discussion of SVT, and documentation.       History of Present Illness/Subjective    Ms. Rola Miller is a 46 year old female with known history of SVT with a couple of episodes 10 years ago.  She had one episode where she had more persistent of racing heartbeat and then shortly thereafter about 10 years ago after taking some Sudafed she had to go to the hospital where it sounds like she had persistent SVT although it likely broke while she is in the emergency room and I do not have those records.  The patient that she was at [facility] but the records back from emergency department do not have any progress notes.    More recently she does get feeling of palpitations which she describes as a sinking feeling and then  samantha chunk feeling in her chest.  Typically they come and go very quickly and when she has a particularly bad episode and can be sporadic for 5 minutes and then go away.  It is not associated with syncope, or near syncope.  She does not get shortness of breath and gets an unusual feeling in her chest but not specifically chest pain.    She feels like that these episodes are about once a month or so or in much more minor episodes are a bit more frequent.    She is physically active and does a exercise routine every morning for about 15 minutes to exercise multiple muscle systems in her body.  On the the weekend she generally hikes for 2 to 5 hours without limitations.  She denies any of these palpitations symptoms with physical activity but they occur more with rest.  Chart review does indicated history of ADHD which is not taking any medications currently that would be considered stimulants.    She is not hypertensive, reports that her cholesterol has been okay in the past and her father has hypertension and some type of heart problem.  She has never smoked cigarettes and she is not diabetic.    She grew up in [place].  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  She has 2 grown sons.  She is currently not .         Physical Examination Review of Systems   /66 (BP Location: Left arm, Patient Position: Sitting, Cuff Size: Adult Regular)   Pulse 66   Resp 16   Wt 64.4 kg (142 lb)   SpO2 100%   BMI 22.24 kg/m    Body mass index is 22.24 kg/m .  Wt Readings from Last 3 Encounters:   02/01/22 64.4 kg (142 lb)   01/21/22 65.3 kg (144 lb)   01/03/22 65.4 kg (144 lb 3.2 oz)     General Appearance:   Alert, cooperative and in no acute distress.   ENT/Mouth: Patient wearing a mask.      EYES:  no scleral icterus, normal conjunctivae   Neck: JVP normal. No Hepatojugular reflux. Thyroid not visualized.   Chest/Lungs:   Lungs are clear to auscultation, equal  chest wall expansion.   Cardiovascular:   S1, S2 without murmur ,clicks or rubs. Brachial, radial and posterior tibial pulses are intact and symetric. No carotid bruits noted   Abdomen:  Nontender. BS+. No bruits.   Extremities: No cyanosis, clubbing or edema   Skin: no xanthelasma, warm.    Neurologic: normal arm movement bilateral, no tremors     Psychiatric: Appropriate affect.      Enc Vitals  BP: 108/66  Pulse: 66  Resp: 16  SpO2: 100 %  Weight: 64.4 kg (142 lb)                                           Medical History  Surgical History Family History Social History   Past Medical History:   Diagnosis Date     SVT (supraventricular tachycardia) (H)      Tinnitus, bilateral     No past surgical history on file. Family History   Problem Relation Age of Onset     Thyroid Disease Mother      Diabetes Father      Coronary Artery Disease Father      Bladder Cancer Father     Social History     Socioeconomic History     Marital status:      Spouse name: Not on file     Number of children: Not on file     Years of education: Not on file     Highest education level: Not on file   Occupational History     Not on file   Tobacco Use     Smoking status: Never Smoker     Smokeless tobacco: Never Used   Substance and Sexual Activity     Alcohol use: Not on file     Drug use: Not on file     Sexual activity: Not on file   Other Topics Concern     Not on file   Social History Narrative     Not on file     Social Determinants of Health     Financial Resource Strain: Not on file   Food Insecurity: Not on file   Transportation Needs: Not on file   Physical Activity: Not on file   Stress: Not on file   Social Connections: Not on file   Intimate Partner Violence: Not on file   Housing Stability: Not on file          Medications  Allergies   Current Outpatient Medications   Medication Sig Dispense Refill     norgestim-eth estrad triphasic (ORTHO TRI-CYCLEN) 0.18/0.215/0.25 MG-35 MCG tablet Take 1 tablet by mouth daily 180  tablet 1     CALCIUM CARBONATE/VITAMIN D3 (CALCIUM+D ORAL) [CALCIUM CARBONATE/VITAMIN D3 (CALCIUM+D ORAL)]  (Patient not taking: [CALCIUM CARBONATE/VITAMIN D3 (CALCIUM+D ORAL)] )       cetirizine (ZYRTEC) 10 MG tablet [CETIRIZINE (ZYRTEC) 10 MG TABLET] Take 1 tablet (10 mg total) by mouth daily. (Patient not taking: Reported on 1/3/2022) 30 tablet 2     CYANOCOBALAMIN, VITAMIN B-12, (VITAMIN B12 ORAL) [CYANOCOBALAMIN, VITAMIN B-12, (VITAMIN B12 ORAL)] Take by mouth. (Patient not taking: Reported on 1/3/2022)       meclizine (ANTIVERT) 25 mg tablet [MECLIZINE (ANTIVERT) 25 MG TABLET] Take 1 tablet (25 mg total) by mouth 3 (three) times a day as needed for dizziness or nausea. (Patient not taking: Reported on 1/3/2022) 30 tablet 0     protein Powd [PROTEIN POWD] Take by mouth. (Patient not taking: Reported on 1/21/2022)      Allergies   Allergen Reactions     Azithromycin Unknown         Lab Results    Chemistry/lipid CBC Cardiac Enzymes/BNP/TSH/INR   Lab Results   Component Value Date    CHOL 191 03/05/2013    HDL 63 03/05/2013    TRIG 85 03/05/2013    No results found for: WBC, HGB, HCT, MCV, PLT Lab Results   Component Value Date    TSH 5.62 (H) 01/21/2022

## 2022-02-05 ENCOUNTER — ANCILLARY PROCEDURE (OUTPATIENT)
Dept: MRI IMAGING | Facility: CLINIC | Age: 47
End: 2022-02-05
Attending: PSYCHIATRY & NEUROLOGY
Payer: COMMERCIAL

## 2022-02-05 DIAGNOSIS — M54.2 CERVICALGIA: ICD-10-CM

## 2022-02-05 DIAGNOSIS — R20.0 FACIAL NUMBNESS: ICD-10-CM

## 2022-02-05 DIAGNOSIS — R29.898 WEAKNESS OF LEFT LOWER EXTREMITY: ICD-10-CM

## 2022-02-05 PROCEDURE — 70551 MRI BRAIN STEM W/O DYE: CPT | Mod: GC | Performed by: RADIOLOGY

## 2022-02-05 PROCEDURE — 72141 MRI NECK SPINE W/O DYE: CPT | Mod: GC | Performed by: RADIOLOGY

## 2022-02-05 RX ORDER — GADOBUTROL 604.72 MG/ML
7.5 INJECTION INTRAVENOUS ONCE
Status: DISCONTINUED | OUTPATIENT
Start: 2022-02-05 | End: 2022-02-05

## 2022-02-28 ENCOUNTER — OFFICE VISIT (OUTPATIENT)
Dept: FAMILY MEDICINE | Facility: CLINIC | Age: 47
End: 2022-02-28
Payer: COMMERCIAL

## 2022-02-28 VITALS
BODY MASS INDEX: 21.93 KG/M2 | DIASTOLIC BLOOD PRESSURE: 70 MMHG | WEIGHT: 140 LBS | OXYGEN SATURATION: 100 % | SYSTOLIC BLOOD PRESSURE: 102 MMHG | HEART RATE: 55 BPM

## 2022-02-28 DIAGNOSIS — R59.0 INGUINAL LYMPHADENOPATHY: ICD-10-CM

## 2022-02-28 DIAGNOSIS — F41.1 PRE-OPERATIVE ANXIETY: Primary | ICD-10-CM

## 2022-02-28 PROCEDURE — 99214 OFFICE O/P EST MOD 30 MIN: CPT | Performed by: FAMILY MEDICINE

## 2022-02-28 RX ORDER — SENNOSIDES 8.6 MG
1 TABLET ORAL DAILY PRN
COMMUNITY

## 2022-02-28 RX ORDER — LORAZEPAM 0.5 MG/1
0.5 TABLET ORAL 2 TIMES DAILY PRN
Qty: 10 TABLET | Refills: 0 | Status: SHIPPED | OUTPATIENT
Start: 2022-02-28 | End: 2023-09-06

## 2022-02-28 NOTE — PROGRESS NOTES
Assessment & Plan     Pre-operative anxiety  - LORazepam (ATIVAN) 0.5 MG tablet  Dispense: 10 tablet; Refill: 0    Inguinal lymphadenopathy  Prescription sent in for lorazepam and I did encourage her to start taking it today so that she can know how it makes her feel.  She is to take it at least 30 minutes prior to the procedure.  I also explained to her that the chances of allergic reaction to the contrast.  Also noted still having right-sided inguinal lymph node that is still enlarged.  This was checked out in [place] and did not think there is any problems with it.  We will keep an eye on it at this time.       No follow-ups on file.    eKenan Hair MD  [facility]    Una Canela is a 47 year old who presents for the following health issues   History of Present Illness     Reason for visit:  MRI appointment concerns. She has an appointment for MRI of the brain for possible pituitary adenoma and needed to have a contrast.  She noted having a lot of questions regarding the contrast and feeling very anxious about it.  She wanted to get medication to help her with anxiety for a day or 2.  Symptom onset:  3-4 weeks ago  Symptoms include:  No symptoms related. Questions.  Symptom intensity:  Mild  Symptom progression:  Staying the same  Had these symptoms before:  No  What makes it worse:  These questions do not pertain.  What makes it better:  Unrelated question.    She eats 4 or more servings of fruits and vegetables daily.She consumes 0 sweetened beverage(s) daily.She exercises with enough effort to increase her heart rate 30 to 60 minutes per day.  She exercises with enough effort to increase her heart rate 7 days per week.   She is taking medications regularly.     Family History   Problem Relation Age of Onset     Thyroid Disease Mother      Diabetes Father      Coronary Artery Disease Father      Bladder Cancer Father       Social History     Socioeconomic  History     Marital status:      Spouse name: Not on file     Number of children: Not on file     Years of education: Not on file     Highest education level: Not on file   Occupational History     Not on file   Tobacco Use     Smoking status: Never Smoker     Smokeless tobacco: Never Used   Substance and Sexual Activity     Alcohol use: Not on file     Drug use: Not on file     Sexual activity: Not on file   Other Topics Concern     Not on file   Social History Narrative     Not on file     Social Determinants of Health     Financial Resource Strain: Not on file   Food Insecurity: Not on file   Transportation Needs: Not on file   Physical Activity: Not on file   Stress: Not on file   Social Connections: Not on file   Intimate Partner Violence: Not on file   Housing Stability: Not on file      No past surgical history on file.   Past Medical History:   Diagnosis Date     SVT (supraventricular tachycardia) (H)      Tinnitus, bilateral           Review of Systems   CONSTITUTIONAL: NEGATIVE for fever, chills, change in weight  ENT/MOUTH: NEGATIVE for ear, mouth and throat problems  RESP: NEGATIVE for significant cough or SOB  CV: NEGATIVE for chest pain, palpitations or peripheral edema      Objective    /70 (BP Location: Left arm, Patient Position: Sitting, Cuff Size: Adult Regular)   Pulse 55   Wt 63.5 kg (140 lb)   SpO2 100%   BMI 21.93 kg/m    Body mass index is 21.93 kg/m .  Physical Exam   GENERAL: healthy, alert and no distress  NECK: no adenopathy, no asymmetry, masses, or scars and thyroid normal to palpation  RESP: Normal unlabored respiration with no audible abnormalities  CV: peripheral pulses strong and no peripheral edema  ABDOMEN: Flat nondistended abdomen  MS: no gross musculoskeletal defects noted, no edema

## 2022-03-01 ENCOUNTER — ANCILLARY PROCEDURE (OUTPATIENT)
Dept: MRI IMAGING | Facility: CLINIC | Age: 47
End: 2022-03-01
Attending: PSYCHIATRY & NEUROLOGY
Payer: COMMERCIAL

## 2022-03-01 DIAGNOSIS — R93.89 ABNORMAL MRI: ICD-10-CM

## 2022-03-01 PROCEDURE — 70543 MRI ORBT/FAC/NCK W/O &W/DYE: CPT | Performed by: RADIOLOGY

## 2022-03-01 PROCEDURE — A9585 GADOBUTROL INJECTION: HCPCS | Mod: JW | Performed by: RADIOLOGY

## 2022-03-01 RX ORDER — GADOBUTROL 604.72 MG/ML
7.5 INJECTION INTRAVENOUS ONCE
Status: COMPLETED | OUTPATIENT
Start: 2022-03-01 | End: 2022-03-01

## 2022-03-01 RX ADMIN — GADOBUTROL 6.5 ML: 604.72 INJECTION INTRAVENOUS at 14:03

## 2022-03-04 ENCOUNTER — TELEPHONE (OUTPATIENT)
Dept: NEUROLOGY | Facility: CLINIC | Age: 47
End: 2022-03-04
Payer: COMMERCIAL

## 2022-03-04 NOTE — TELEPHONE ENCOUNTER
It appears that she did cancel that appointment. Unsure as to the reasoning for this.     Jeannie Herbert RN on 3/4/2022 at 11:06 AM

## 2022-03-04 NOTE — TELEPHONE ENCOUNTER
[facility] Call Center    Phone Message    May a detailed message be left on voicemail: yes     Reason for Call: Other: Call back patient in regard to test result.  Patient very concerned about results and appointment is not until June.  Please call patient back to advise next steps.     Action Taken: Message routed to:  [facility]    Travel Screening: Not Applicable

## 2022-03-04 NOTE — TELEPHONE ENCOUNTER
MRI C spine shows mild degeneration. Blood test positive for ALEJANDRO. Nothing concerning. Can follow up sooner if there is opening-- Alla can check.

## 2022-03-04 NOTE — TELEPHONE ENCOUNTER
Spoke to pt and gave the results. Pt is ok with waiting until June for her appointment.   Cristy Zuñiga MA on 3/4/2022 at 12:44 PM

## 2022-03-24 ENCOUNTER — HOSPITAL ENCOUNTER (OUTPATIENT)
Dept: CARDIOLOGY | Facility: HOSPITAL | Age: 47
Discharge: HOME | End: 2022-03-24
Attending: INTERNAL MEDICINE
Payer: COMMERCIAL

## 2022-03-24 ENCOUNTER — HOSPITAL ENCOUNTER (EMERGENCY)
Facility: HOSPITAL | Age: 47
Discharge: HOME | End: 2022-03-24
Attending: EMERGENCY MEDICINE | Admitting: EMERGENCY MEDICINE
Payer: COMMERCIAL

## 2022-03-24 VITALS
RESPIRATION RATE: 18 BRPM | TEMPERATURE: 98 F | OXYGEN SATURATION: 100 % | HEART RATE: 55 BPM | SYSTOLIC BLOOD PRESSURE: 116 MMHG | WEIGHT: 140 LBS | DIASTOLIC BLOOD PRESSURE: 71 MMHG | BODY MASS INDEX: 21.93 KG/M2

## 2022-03-24 DIAGNOSIS — I47.10 SVT (SUPRAVENTRICULAR TACHYCARDIA) (H): ICD-10-CM

## 2022-03-24 DIAGNOSIS — R00.2 PALPITATIONS: ICD-10-CM

## 2022-03-24 LAB
ANION GAP SERPL CALCULATED.3IONS-SCNC: 10 MMOL/L (ref 5–18)
BI-PLANE LVEF ECHO: NORMAL
BUN SERPL-MCNC: 8 MG/DL (ref 8–22)
CALCIUM SERPL-MCNC: 9 MG/DL (ref 8.5–10.5)
CHLORIDE BLD-SCNC: 107 MMOL/L (ref 98–107)
CO2 SERPL-SCNC: 22 MMOL/L (ref 22–31)
CREAT SERPL-MCNC: 0.84 MG/DL (ref 0.6–1.1)
GFR SERPL CREATININE-BSD FRML MDRD: 86 ML/MIN/1.73M2
GLUCOSE BLD-MCNC: 109 MG/DL (ref 70–125)
HOLD SPECIMEN: NORMAL
MAGNESIUM SERPL-MCNC: 1.8 MG/DL (ref 1.8–2.6)
POTASSIUM BLD-SCNC: 3.8 MMOL/L (ref 3.5–5)
SODIUM SERPL-SCNC: 139 MMOL/L (ref 136–145)

## 2022-03-24 PROCEDURE — 93306 TTE W/DOPPLER COMPLETE: CPT | Mod: 26 | Performed by: GENERAL ACUTE CARE HOSPITAL

## 2022-03-24 PROCEDURE — 93306 TTE W/DOPPLER COMPLETE: CPT

## 2022-03-24 PROCEDURE — 93270 REMOTE 30 DAY ECG REV/REPORT: CPT

## 2022-03-24 PROCEDURE — 93005 ELECTROCARDIOGRAM TRACING: CPT | Performed by: STUDENT IN AN ORGANIZED HEALTH CARE EDUCATION/TRAINING PROGRAM

## 2022-03-24 PROCEDURE — 83735 ASSAY OF MAGNESIUM: CPT | Performed by: EMERGENCY MEDICINE

## 2022-03-24 PROCEDURE — 82310 ASSAY OF CALCIUM: CPT | Performed by: EMERGENCY MEDICINE

## 2022-03-24 PROCEDURE — 99284 EMERGENCY DEPT VISIT MOD MDM: CPT

## 2022-03-24 PROCEDURE — 36415 COLL VENOUS BLD VENIPUNCTURE: CPT | Performed by: EMERGENCY MEDICINE

## 2022-03-24 NOTE — ED PROVIDER NOTES
EMERGENCY DEPARTMENT ENCOUNTER      NAME: Rola Miller  AGE: 47 year old female  YOB: 1975  MRN: [id number]  EVALUATION DATE & TIME: 3/24/2022  7:54 AM    PCP: Keenan Hair    ED PROVIDER: Robyn Balderas MD    Chief Complaint   Patient presents with     Chest Pain         FINAL IMPRESSION:  1. Palpitations          ED COURSE & MEDICAL DECISION MAKING:    Pertinent Labs & Imaging studies reviewed. (See chart for details)  47 year old female with history of previous episodes of SVT who presents to the Emergency Department for evaluation of palpitations with lightheadedness and discomfort in her chest lasting for 30 minutes while she was driving here for cardiac monitor this morning.  She is now asymptomatic.  Symptoms are most consistent with paroxysmal arrhythmia.  She gets regular thyroid testing due to family history of thyroid disease and this was done within the last 1 to 2 months and does not warrant repeat.  Had coffee this morning, 1 cup at home, but denies any other significant caffeine or stimulant use/abuse.  Unlikely electrolyte abnormality, differential includes anxiety.    Patient placed on monitor, IV established and blood obtained.  Twelve-lead EKG shows sinus rhythm.  BMP, magnesium unremarkable.  Heart rate in the 50s to 60s primarily well on telemetry, therefore no beta-blockade started for symptoms.  We will discharge her and have her follow-up in heart center for her previously planned testing.      ED Course as of 03/24/22 0859   Thu Mar 24, 2022   0756 I introduced myself to the patient, performed my physical exam, and discussed plan for ED workup.    0858 Rechecked and updated the patient. We discussed the plan for discharge and the patient is agreeable. Reviewed supportive cares, symptomatic treatment, outpatient follow up, and reasons to return to the Emergency Department. Patient to be discharged by ED RN.          At the conclusion of the encounter I  discussed the results of all of the tests and the disposition. The questions were answered. The patient or family acknowledged understanding and was agreeable with the care plan.      MEDICATIONS GIVEN IN THE EMERGENCY:  Medications - No data to display    NEW PRESCRIPTIONS STARTED AT TODAY'S ER VISIT  New Prescriptions    No medications on file          =================================================================    HPI    Patient information was obtained from: Patient    Use of Intrepreter: N/A      Rola Miller is a 47 year old female with pertinent medical history of SVT, palpitations, and esophageal reflux who presents to the ED for evaluation of palpitations.    The patient reports onset of palpitations and chest pain around 0715 this morning. She reports similar palpitations in the past. She has similar episodes of palpitations about once per month, and has episodes of SVT once every few years. With her palpitations today she had associated lightheadedness. Normally she is able to stop her palpitations with deep breathing, but she did not have any relief this morning. The palpitations resolved after about 30 minutes.     She reports caffeine intake of about 1 cup of coffee per day. She does not use methamphetamine or cocaine.    She also has maria l thyroid levels monitored monthly. She does not take any medications for her thyroid.    Denies any other complaints at this time.    REVIEW OF SYSTEMS  Constitutional:  Denies fever, chills, weight loss or weakness  Respiratory: No SOB, wheeze or cough.  Cardiovascular:  Endorses chest pain, palpitations  GI:  Denies abdominal pain, nausea, vomiting, diarrhea  Musculoskeletal:  Denies any new muscle/joint pain, swelling or loss of function.  Neurologic:  Denies headache, focal weakness or sensory changes. Endorses lightheadedness.    All other systems negative unless noted in HPI.      PAST MEDICAL HISTORY:  Past Medical History:   Diagnosis Date      Gastroesophageal reflux disease      SVT (supraventricular tachycardia) (H)      Tinnitus, bilateral        PAST SURGICAL HISTORY:  History reviewed. No pertinent surgical history.    CURRENT MEDICATIONS:    Prior to Admission Medications   Prescriptions Last Dose Informant Patient Reported? Taking?   LORazepam (ATIVAN) 0.5 MG tablet   No No   Sig: Take 1 tablet (0.5 mg) by mouth 2 times daily as needed for anxiety (before procedure.)   norgestim-eth estrad triphasic (ORTHO TRI-CYCLEN) 0.18/0.215/0.25 MG-35 MCG tablet   No No   Sig: Take 1 tablet by mouth daily   sennosides (SENOKOT) 8.6 MG tablet   Yes No   Sig: Take 1 tablet by mouth daily as needed      Facility-Administered Medications: None       ALLERGIES:  Allergies   Allergen Reactions     Azithromycin Unknown       FAMILY HISTORY:  Family History   Problem Relation Age of Onset     Thyroid Disease Mother      Diabetes Father      Coronary Artery Disease Father      Bladder Cancer Father        SOCIAL HISTORY:  Social History     Tobacco Use     Smoking status: Never Smoker     Smokeless tobacco: Never Used   Substance Use Topics     Alcohol use: None     Drug use: None        VITALS:  Patient Vitals for the past 24 hrs:   BP Temp Temp src Pulse Resp SpO2 Weight   03/24/22 0800 135/88 -- -- 72 24 100 % --   03/24/22 0752 (!) 147/77 98  F (36.7  C) Tympanic 79 22 99 % 63.5 kg (140 lb)       PHYSICAL EXAM    General Appearance: Well-appearing, well-nourished, no acute distress. Slightly anxious, thin.  Head:  Normocephalic  Eyes:  conjunctiva/corneas clear  ENT:   membranes are moist without pallor  Neck:  Supple  Cardio:  Regular rate and rhythm, no murmur/gallop/rub, 2+ pulses symmetric in all extremities  Pulm:  No respiratory distress, clear to auscultation bilaterally  Abdomen:  Soft, non-tender, non distended,no rebound or guarding.  Extremities: Moves all extremities normally, normal gait.  No peripheral edema  Skin:  Skin warm, dry, no rashes  Neuro:   Alert and oriented ×3, moving all extremities, no gross sensory defects     RADIOLOGY/LABS:  Reviewed all pertinent imaging. Please see official radiology report. All pertinent labs reviewed and interpreted.    Results for orders placed or performed during the hospital encounter of 03/24/22   Basic metabolic panel   Result Value Ref Range    Sodium 139 136 - 145 mmol/L    Potassium 3.8 3.5 - 5.0 mmol/L    Chloride 107 98 - 107 mmol/L    Carbon Dioxide (CO2) 22 22 - 31 mmol/L    Anion Gap 10 5 - 18 mmol/L    Urea Nitrogen 8 8 - 22 mg/dL    Creatinine 0.84 0.60 - 1.10 mg/dL    Calcium 9.0 8.5 - 10.5 mg/dL    Glucose 109 70 - 125 mg/dL    GFR Estimate 86 >60 mL/min/1.73m2   Result Value Ref Range    Magnesium 1.8 1.8 - 2.6 mg/dL       EKG:  Performed at: 08:15    Impression: Sinus rhythm. Normal ECG.    Rate: 60 bpm  Rhythm: Sinus  Axis: 70  NV Interval: 118 ms  QRS Interval: 90 ms  QTc Interval: 422 ms  ST Changes: None  Comparison: No change from previous ECG of 9/22/13.  I have independently reviewed and interpreted the EKG(s) documented above.    The creation of this record is based on the scribe s observations of the work being performed by Robyn Balderas MD and the provider s statements to them. It was created on his behalf by Krishna Martines, a trained medical scribe. This document has been checked and approved by the attending provider.    Robyn Balderas MD  Emergency Medicine  [facility] EMERGENCY DEPARTMENT  [facility] [facility] [id number] [phone]  Dept: [phone]     Robyn Balderas MD  03/24/22 0877

## 2022-03-24 NOTE — DISCHARGE INSTRUCTIONS
Electrolytes today were normal.  EKG and cardiac monitoring today while in the ER were normal without signs of abnormal rhythm.  Follow-up with heart clinic for your cardiac testing as previously planned.

## 2022-03-24 NOTE — ED TRIAGE NOTES
Patient arrives by private car for evaluation of chest pain x 30 minutes. Reports CP resolved when she walked in the door.  Reports history of SVT.  Has appt for cardiac monitor placement this am

## 2022-03-29 LAB
ATRIAL RATE - MUSE: 60 BPM
DIASTOLIC BLOOD PRESSURE - MUSE: 88 MMHG
INTERPRETATION ECG - MUSE: NORMAL
P AXIS - MUSE: 57 DEGREES
PR INTERVAL - MUSE: 118 MS
QRS DURATION - MUSE: 90 MS
QT - MUSE: 422 MS
QTC - MUSE: 422 MS
R AXIS - MUSE: 70 DEGREES
SYSTOLIC BLOOD PRESSURE - MUSE: 135 MMHG
T AXIS - MUSE: 57 DEGREES
VENTRICULAR RATE- MUSE: 60 BPM

## 2022-04-08 PROCEDURE — 93272 ECG/REVIEW INTERPRET ONLY: CPT | Performed by: INTERNAL MEDICINE

## 2022-04-11 DIAGNOSIS — I47.10 SVT (SUPRAVENTRICULAR TACHYCARDIA) (H): Primary | ICD-10-CM

## 2022-04-11 DIAGNOSIS — R00.2 PALPITATIONS: ICD-10-CM

## 2022-04-28 ENCOUNTER — OFFICE VISIT (OUTPATIENT)
Dept: OTOLARYNGOLOGY | Facility: CLINIC | Age: 47
End: 2022-04-28
Payer: COMMERCIAL

## 2022-04-28 ENCOUNTER — OFFICE VISIT (OUTPATIENT)
Dept: AUDIOLOGY | Facility: CLINIC | Age: 47
End: 2022-04-28
Payer: COMMERCIAL

## 2022-04-28 VITALS
OXYGEN SATURATION: 100 % | SYSTOLIC BLOOD PRESSURE: 115 MMHG | DIASTOLIC BLOOD PRESSURE: 82 MMHG | RESPIRATION RATE: 16 BRPM | HEART RATE: 64 BPM

## 2022-04-28 DIAGNOSIS — H93.13 TINNITUS, BILATERAL: Primary | ICD-10-CM

## 2022-04-28 DIAGNOSIS — H93.13 TINNITUS OF BOTH EARS: Primary | ICD-10-CM

## 2022-04-28 PROCEDURE — 92550 TYMPANOMETRY & REFLEX THRESH: CPT | Performed by: AUDIOLOGIST

## 2022-04-28 PROCEDURE — 99207 PR NO CHARGE LOS: CPT | Performed by: AUDIOLOGIST

## 2022-04-28 PROCEDURE — 92557 COMPREHENSIVE HEARING TEST: CPT | Performed by: AUDIOLOGIST

## 2022-04-28 PROCEDURE — 99203 OFFICE O/P NEW LOW 30 MIN: CPT | Performed by: OTOLARYNGOLOGY

## 2022-04-28 RX ORDER — CYCLOBENZAPRINE HCL 5 MG
5-10 TABLET ORAL AT BEDTIME
Qty: 40 TABLET | Refills: 1 | Status: SHIPPED | OUTPATIENT
Start: 2022-04-28 | End: 2023-09-06
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
## 2022-04-28 NOTE — PATIENT INSTRUCTIONS
- continue to do neck treatments to keep neck pain free and healthy  - work on stopping grinding, keep jaw pain away.   - check with dentist on mouth guard and TMJ treatments  - try muscle relaxant flexeril at night for 2-3 weeks to see if this helps.  - minimize stress, anxiety, depression and pain  - continuing exercise  - Tinnitus and hyperacusis clinic in [place] [phone]  - Chiropractor Whitney Rene at [facility] and [facility] [phone].
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
## 2022-05-06 ENCOUNTER — MYC MEDICAL ADVICE (OUTPATIENT)
Dept: OTOLARYNGOLOGY | Facility: CLINIC | Age: 47
End: 2022-05-06
Payer: COMMERCIAL

## 2022-05-06 DIAGNOSIS — H93.13 TINNITUS, BILATERAL: Primary | ICD-10-CM

## 2022-05-08 RX ORDER — TIZANIDINE 2 MG/1
2 TABLET ORAL 3 TIMES DAILY PRN
Qty: 40 TABLET | Refills: 1 | Status: SHIPPED | OUTPATIENT
Start: 2022-05-08 | End: 2023-09-06

## 2022-06-16 ENCOUNTER — OFFICE VISIT (OUTPATIENT)
Dept: NEUROLOGY | Facility: CLINIC | Age: 47
End: 2022-06-16
Payer: COMMERCIAL

## 2022-06-16 VITALS
DIASTOLIC BLOOD PRESSURE: 88 MMHG | HEART RATE: 58 BPM | HEIGHT: 67 IN | SYSTOLIC BLOOD PRESSURE: 152 MMHG | BODY MASS INDEX: 21.97 KG/M2 | WEIGHT: 140 LBS

## 2022-06-16 DIAGNOSIS — T50.B95A ADVERSE EFFECT OF COVID-19 VACCINE: ICD-10-CM

## 2022-06-16 DIAGNOSIS — M54.2 CERVICALGIA: ICD-10-CM

## 2022-06-16 DIAGNOSIS — R20.0 FACIAL NUMBNESS: ICD-10-CM

## 2022-06-16 DIAGNOSIS — R76.8 POSITIVE ANA (ANTINUCLEAR ANTIBODY): ICD-10-CM

## 2022-06-16 DIAGNOSIS — R93.89 ABNORMAL MRI: Primary | ICD-10-CM

## 2022-06-16 DIAGNOSIS — R29.898 WEAKNESS OF LEFT LOWER EXTREMITY: ICD-10-CM

## 2022-06-16 PROCEDURE — 99215 OFFICE O/P EST HI 40 MIN: CPT | Performed by: PSYCHIATRY & NEUROLOGY

## 2022-06-16 RX ORDER — BACLOFEN 10 MG/1
5 TABLET ORAL 3 TIMES DAILY PRN
Qty: 90 TABLET | Refills: 0 | Status: SHIPPED | OUTPATIENT
Start: 2022-06-16 | End: 2023-09-06
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
## 2022-06-16 NOTE — NURSING NOTE
Chief Complaint   Patient presents with     Results     Discuss labs and MRI's.     Shandra Aceves LPN on 6/16/2022 at 8:12 AM
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
## 2022-09-25 ENCOUNTER — HEALTH MAINTENANCE LETTER (OUTPATIENT)
Age: 47
End: 2022-09-25

## 2023-01-19 ENCOUNTER — LAB (OUTPATIENT)
Dept: LAB | Facility: CLINIC | Age: 48
End: 2023-01-19
Payer: COMMERCIAL

## 2023-01-19 ENCOUNTER — OFFICE VISIT (OUTPATIENT)
Dept: RHEUMATOLOGY | Facility: CLINIC | Age: 48
End: 2023-01-19
Attending: PSYCHIATRY & NEUROLOGY
Payer: COMMERCIAL

## 2023-01-19 VITALS
WEIGHT: 140.9 LBS | HEIGHT: 67 IN | SYSTOLIC BLOOD PRESSURE: 100 MMHG | HEART RATE: 92 BPM | DIASTOLIC BLOOD PRESSURE: 64 MMHG | BODY MASS INDEX: 22.11 KG/M2

## 2023-01-19 DIAGNOSIS — R76.8 POSITIVE ANA (ANTINUCLEAR ANTIBODY): Primary | ICD-10-CM

## 2023-01-19 DIAGNOSIS — R76.8 POSITIVE ANA (ANTINUCLEAR ANTIBODY): ICD-10-CM

## 2023-01-19 DIAGNOSIS — M54.2 CERVICALGIA: ICD-10-CM

## 2023-01-19 DIAGNOSIS — L40.9 PSORIASIS: ICD-10-CM

## 2023-01-19 LAB
C3 SERPL-MCNC: 94 MG/DL (ref 81–157)
C4 SERPL-MCNC: 17 MG/DL (ref 13–39)
HCV AB SERPL QL IA: NONREACTIVE

## 2023-01-19 PROCEDURE — 86803 HEPATITIS C AB TEST: CPT

## 2023-01-19 PROCEDURE — 86235 NUCLEAR ANTIGEN ANTIBODY: CPT

## 2023-01-19 PROCEDURE — 86225 DNA ANTIBODY NATIVE: CPT

## 2023-01-19 PROCEDURE — 86160 COMPLEMENT ANTIGEN: CPT

## 2023-01-19 PROCEDURE — 99204 OFFICE O/P NEW MOD 45 MIN: CPT | Performed by: INTERNAL MEDICINE

## 2023-01-19 PROCEDURE — 36415 COLL VENOUS BLD VENIPUNCTURE: CPT
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
## 2023-01-20 LAB
DSDNA AB SER-ACNC: 1.1 IU/ML
ENA SM IGG SER IA-ACNC: <0.7 U/ML
ENA SM IGG SER IA-ACNC: NEGATIVE
ENA SS-A AB SER IA-ACNC: <0.5 U/ML
ENA SS-A AB SER IA-ACNC: NEGATIVE
ENA SS-B IGG SER IA-ACNC: <0.6 U/ML
ENA SS-B IGG SER IA-ACNC: NEGATIVE
U1 SNRNP IGG SER IA-ACNC: 2.3 U/ML
U1 SNRNP IGG SER IA-ACNC: NEGATIVE

## 2023-04-12 ENCOUNTER — OFFICE VISIT (OUTPATIENT)
Dept: OBGYN | Facility: CLINIC | Age: 48
End: 2023-04-12
Payer: COMMERCIAL

## 2023-04-12 VITALS
DIASTOLIC BLOOD PRESSURE: 75 MMHG | HEART RATE: 63 BPM | BODY MASS INDEX: 22.49 KG/M2 | OXYGEN SATURATION: 100 % | WEIGHT: 143.6 LBS | SYSTOLIC BLOOD PRESSURE: 127 MMHG

## 2023-04-12 DIAGNOSIS — Z12.11 COLON CANCER SCREENING: ICD-10-CM

## 2023-04-12 DIAGNOSIS — Z01.419 ENCOUNTER FOR GYNECOLOGICAL EXAMINATION WITHOUT ABNORMAL FINDING: Primary | ICD-10-CM

## 2023-04-12 PROCEDURE — 99386 PREV VISIT NEW AGE 40-64: CPT | Performed by: OBSTETRICS & GYNECOLOGY

## 2023-04-12 PROCEDURE — G0145 SCR C/V CYTO,THINLAYER,RESCR: HCPCS | Performed by: OBSTETRICS & GYNECOLOGY

## 2023-04-12 PROCEDURE — 87624 HPV HI-RISK TYP POOLED RSLT: CPT | Performed by: OBSTETRICS & GYNECOLOGY

## 2023-04-12 NOTE — PATIENT INSTRUCTIONS
If you have any questions regarding your visit, Please contact your care team.    To Schedule an Appointment 24/7  Call: [phone]  [facility]  TELEPHONE NUMBER   Angel Madison M.D.    Christine-Medical Assistant    Arielle Abraham-Surgery Scheduler  Sagrario-Surgery Scheduler Tuesday-[place]                   7:30 a.m.-3:30 p.m.  Wednesday-[place]             8:00 a.m.-4:30 p.m.  Thursday-[place]     8:00 a.m.-4:00 p.m.  Friday-[place]                       7:30 a.m.-1:00 p.m. [address] [id number] [phone] Fax    Imaging Scheduling all locations  [phone]      [facility]  [address]  [place], [place] [address] [phone]    [facility]  [address] [place] [address] [phone] ask for Women's Clinic     **Surgeries** Our Surgery Schedulers will contact you to schedule. If you do not receive a call within 3 business days, please call [phone].    Urgent Care locations:  [facility] Monday-Friday                 10 am - 8 pm  Saturday and Sunday        9 am - 5 pm   [phone]   If you need a medication refill, please contact your pharmacy. Please allow 3 business days for your refill to be completed.  As always, Thank you for trusting us with your healthcare needs!  If you have any questions regarding your visit, Please contact your care team.    [employer]: [phone]    To Schedule an Appointment 24/7  Call: [phone]    see additional instructions from your care team below

## 2023-04-12 NOTE — PROGRESS NOTES
Rola Miller is a 48 year old female , who presents for annual exam.   No unusual bleeding, no incontinence, or unusual discharge.   She is not sexually active and she is not on contraception.     Last cholesterol: Recent Labs   Lab Test 05/10/17  0945        Past Medical History:   Diagnosis Date     Gastroesophageal reflux disease      SVT (supraventricular tachycardia) (H)      Tinnitus, bilateral        Past Surgical History:   Procedure Laterality Date     NO HISTORY OF SURGERY         OB History    Para Term  AB Living   0 0 0 0 0 0   SAB IAB Ectopic Multiple Live Births   0 0 0 0 0       Gyn History:  Gynecological History         Patient's last menstrual period was 2023 (exact date).     No STD/No PID/No IUD      history of abnormal pap smear:  2019 with [facility]   Last pap: No results found for: PAP        Current Outpatient Medications   Medication Sig Dispense Refill     BIOTIN PO Take 2 tablets every day       Glucosamine HCl (GLUCOSAMINE PO) Take 1 tablet every day       sennosides (SENOKOT) 8.6 MG tablet Take 1 tablet by mouth daily as needed       baclofen (LIORESAL) 10 MG tablet Take 0.5 tablets (5 mg) by mouth 3 times daily as needed for other (Neck pain) (Patient not taking: Reported on 2023) 90 tablet 0     cyclobenzaprine (FLEXERIL) 5 MG tablet Take 1-2 tablets (5-10 mg) by mouth At Bedtime (Patient not taking: Reported on 2022) 40 tablet 1     LORazepam (ATIVAN) 0.5 MG tablet Take 1 tablet (0.5 mg) by mouth 2 times daily as needed for anxiety (before procedure.) (Patient not taking: Reported on 2022) 10 tablet 0     norgestim-eth estrad triphasic (ORTHO TRI-CYCLEN) 0.18/0.215/0.25 MG-35 MCG tablet Take 1 tablet by mouth daily (Patient not taking: Reported on 2023) 180 tablet 1     tiZANidine (ZANAFLEX) 2 MG tablet Take 1 tablet (2 mg) by mouth 3 times daily as needed for muscle spasms (Patient not taking: Reported on  1/19/2023) 40 tablet 1     UNABLE TO FIND MEDICATION NAME: Dim- take 1 tab every day     Ferritin- Take 1 tab every day (Patient not taking: Reported on 4/12/2023)         Allergies   Allergen Reactions     Azithromycin Unknown       Social History     Socioeconomic History     Marital status:      Spouse name: Not on file     Number of children: Not on file     Years of education: Not on file     Highest education level: Not on file   Occupational History     Not on file   Tobacco Use     Smoking status: Former     Types: Cigarettes     Smokeless tobacco: Never     Tobacco comments:     30 years ago when young   Vaping Use     Vaping status: Not on file   Substance and Sexual Activity     Alcohol use: Yes     Comment: rare     Drug use: Not on file     Sexual activity: Not Currently   Other Topics Concern     Not on file   Social History Narrative     Not on file     Social Determinants of Health     Financial Resource Strain: Not on file   Food Insecurity: Not on file   Transportation Needs: Not on file   Physical Activity: Not on file   Stress: Not on file   Social Connections: Not on file   Intimate Partner Violence: Not on file   Housing Stability: Not on file       Family History   Problem Relation Age of Onset     Thyroid Disease Mother      Diabetes Father      Coronary Artery Disease Father      Bladder Cancer Father          ROS:  All negative except as above.      EXAM:  MA present for the exam.   /75 (BP Location: Right arm, Cuff Size: Adult Regular)   Pulse 63   Wt 65.1 kg (143 lb 9.6 oz)   LMP 03/27/2023 (Exact Date)   SpO2 100%   BMI 22.49 kg/m    General:  WNWD female, NAD  Alert  Oriented x 3  Gait:  Normal  Skin:  Normal skin turgor  Neurologic:  CN grossly intact, good sensation.    HEENT:  NC/AT, EOMI  Neck:  No masses palpated, symmetrical, carotids +2/4, no bruits heard  Heart:  RRR  Lungs:  Clear   Breasts: declined as she has had recent breast exam for breast pain and she  has orders for [place]ogram and [place] [facility]    Abdomen:  Non-tender, non-distended.  Vulva: No external lesions, normal hair distribution, no adenopathy  BUS:  Normal, no masses noted  Urethra:  No hypermobility noted  Urethral meatus:  No masses noted  Vagina: Moist, pink, no abnormal discharge, well rugated, no lesions  Cervix: Smooth, pink, no visible lesions  Uterus: Normal size, anteverted, non-tender, mobile  Ovaries: No mass, non-tender, mobile  Perianal:  No masses noted.    Extremities:  No clubbing, cyanosis, or edema      ASSESSMENT/PLAN   Annual examination with pap smear   She will have the mammogram with [facility] that had placed the order due to breast pain.   Colonoscopy advised since she is over 45.  She had a colonoscopy about 10 years ago.    Low fat diet, weight bearing exercises and self breast exams on a monthly basis have been recommended.  I have discussed with patient the risks, benefits, medications, treatment options and modalities.   I have instructed the patient to call or schedule a follow-up appointment if any problems.

## 2023-04-15 LAB
BKR LAB AP GYN ADEQUACY: NORMAL
BKR LAB AP GYN INTERPRETATION: NORMAL
BKR LAB AP HPV REFLEX: NORMAL
BKR LAB AP LMP: NORMAL
BKR LAB AP PREVIOUS ABNORMAL: NORMAL
PATH REPORT.COMMENTS IMP SPEC: NORMAL
PATH REPORT.COMMENTS IMP SPEC: NORMAL
PATH REPORT.RELEVANT HX SPEC: NORMAL

## 2023-04-18 ENCOUNTER — MYC MEDICAL ADVICE (OUTPATIENT)
Dept: OBGYN | Facility: CLINIC | Age: 48
End: 2023-04-18
Payer: COMMERCIAL

## 2023-04-18 LAB
HUMAN PAPILLOMA VIRUS 16 DNA: NEGATIVE
HUMAN PAPILLOMA VIRUS 18 DNA: NEGATIVE
HUMAN PAPILLOMA VIRUS FINAL DIAGNOSIS: NORMAL
HUMAN PAPILLOMA VIRUS OTHER HR: NEGATIVE

## 2023-07-05 NOTE — PROGRESS NOTES
Assessment & Plan     OCP (oral contraceptive pills) initiation  The following counseling on birth control pills was provided   Birth control pills are a medication you take every day to prevent pregnancy. If birth control pills are always used correctly, less than 1 out of 100 women using them will get pregnant each year. When you first start the pill, it takes several days to begin working. Be sure to use backup birth control (like a condom) for the first 7 days preferably till the first packet is completed.  The hormones in the pill keep your ovaries from releasing eggs and thicken your cervical mucus to block sperm from getting into the uterus.  Most women can get pregnant quickly when they stop using the pill.  Your periods may become lighter and less painful if you take the pill.  The hormones in pills offer health benefits. The pill can offer some protection against acne, non-cancerous breast growths, ectopic pregnancy, endometrial and ovarian cancers, iron deficiency anemia, and ovarian cysts.  Birth control pills do not protect against sexually transmitted infections (STIs). Some women may have side effects while using birth control pills. They include bleeding between periods, breast tenderness, and nausea. Some of the most common side effects only last for the first few months.   Risks discussed including risk for heart attack, stroke and blood clots.  Patient is not a smoker and has no personal or family history of blood clots/bleeding disorders.  Regular condom use is recommended to help protect against STIs.  - HCG Quantitative Pregnancy, Blood (QLD178); Future  - HCG Qual, Urine ([id number]); Future    If pregnancy test negative will give 1  Year supply   Screen for STD (sexually transmitted disease)    - HIV Antigen Antibody Combo; Future  - Treponema Abs w Reflex to RPR and Titer; Future  - Chlamydia trachomatis PCR; Future  - Neisseria gonorrhoeae PCR; Future                 No follow-ups on  file.    Charles Matson MD  [facility] RAJINDER Canela is a 46 year old who presents for the following health issues     HPI     Patient is here for birth control refill.  Patient was on Tri-Sprintec in the past  She has not been taking it since last summer.  She is sexually active with 1 partner using condoms sometimes       The following counseling on birth control pills was done:  Birth control pills are a medication you take every day to prevent pregnancy. If birth control pills are always used correctly, less than 1 out of 100 women using them will get pregnant each year. When you first start the pill, it takes several days to begin working. Be sure to use backup birth control (like a condom) for the first 7 days preferably till the first packet is completed.  The hormones in the pill keep your ovaries from releasing eggs and thicken your cervical mucus to block sperm from getting into the uterus.  Most women can get pregnant quickly when they stop using the pill.  Your periods may become lighter and less painful if you take the pill.  The hormones in pills offer health benefits. The pill can offer some protection against acne, non-cancerous breast growths, ectopic pregnancy, endometrial and ovarian cancers, iron deficiency anemia, and ovarian cysts.  Birth control pills do not protect against sexually transmitted infections (STIs). Some women may have side effects while using birth control pills. They include bleeding between periods, breast tenderness, and nausea. Some of the most common side effects only last for the first few months.   Risks discussed including risk for heart attack, stroke and blood clots.  Patient is not a smoker and has no personal or family history of blood clots/bleeding disorders.  Regular condom use is recommended to help protect against STIs.      Bilateral ear pain , intermittent, no ear discharge       Review of Systems   Constitutional, HEENT,  "cardiovascular, pulmonary, gi and gu systems are negative, except as otherwise noted.      Objective    /70   Pulse 91   Temp 98.2  F (36.8  C) (Oral)   Ht 1.702 m (5' 7\")   Wt 65.4 kg (144 lb 3.2 oz)   SpO2 99%   BMI 22.58 kg/m    Body mass index is 22.58 kg/m .  Physical Exam  Vitals and nursing note reviewed.   Constitutional:       General: She is not in acute distress.     Appearance: Normal appearance. She is not ill-appearing, toxic-appearing or diaphoretic.   HENT:      Head: Normocephalic and atraumatic.      Right Ear: No decreased hearing noted. No laceration, drainage or swelling. There is no impacted cerumen. Tympanic membrane is not injected.      Left Ear: No decreased hearing noted. No laceration, drainage or swelling. There is no impacted cerumen. Tympanic membrane is not injected.   Neurological:      Mental Status: She is alert.                        " Depth Of Tumor Invasion (For Histology): dermis

## 2023-07-12 LAB — TSH SERPL-ACNC: 3.8 MIU/L (ref 0.27–4.2)

## 2023-08-17 ENCOUNTER — TRANSFERRED RECORDS (OUTPATIENT)
Dept: MULTI SPECIALTY CLINIC | Facility: CLINIC | Age: 48
End: 2023-08-17

## 2023-09-06 ENCOUNTER — OFFICE VISIT (OUTPATIENT)
Dept: OBGYN | Facility: CLINIC | Age: 48
End: 2023-09-06
Payer: COMMERCIAL

## 2023-09-06 VITALS
BODY MASS INDEX: 21.82 KG/M2 | DIASTOLIC BLOOD PRESSURE: 64 MMHG | HEART RATE: 91 BPM | SYSTOLIC BLOOD PRESSURE: 99 MMHG | WEIGHT: 139.3 LBS | OXYGEN SATURATION: 100 %

## 2023-09-06 DIAGNOSIS — Z11.3 SCREEN FOR STD (SEXUALLY TRANSMITTED DISEASE): Primary | ICD-10-CM

## 2023-09-06 DIAGNOSIS — N39.45 CONTINUOUS LEAKAGE OF URINE: ICD-10-CM

## 2023-09-06 LAB
C TRACH DNA SPEC QL NAA+PROBE: NEGATIVE
HCV AB SERPL QL IA: NONREACTIVE
HIV 1+2 AB+HIV1 P24 AG SERPL QL IA: NONREACTIVE
N GONORRHOEA DNA SPEC QL NAA+PROBE: NEGATIVE
T PALLIDUM AB SER QL: NONREACTIVE

## 2023-09-06 PROCEDURE — 87591 N.GONORRHOEAE DNA AMP PROB: CPT | Performed by: OBSTETRICS & GYNECOLOGY

## 2023-09-06 PROCEDURE — 36415 COLL VENOUS BLD VENIPUNCTURE: CPT | Performed by: OBSTETRICS & GYNECOLOGY

## 2023-09-06 PROCEDURE — 87491 CHLMYD TRACH DNA AMP PROBE: CPT | Performed by: OBSTETRICS & GYNECOLOGY

## 2023-09-06 PROCEDURE — 87389 HIV-1 AG W/HIV-1&-2 AB AG IA: CPT | Performed by: OBSTETRICS & GYNECOLOGY

## 2023-09-06 PROCEDURE — 99214 OFFICE O/P EST MOD 30 MIN: CPT | Performed by: OBSTETRICS & GYNECOLOGY

## 2023-09-06 PROCEDURE — 86780 TREPONEMA PALLIDUM: CPT | Performed by: OBSTETRICS & GYNECOLOGY

## 2023-09-06 PROCEDURE — 86803 HEPATITIS C AB TEST: CPT | Performed by: OBSTETRICS & GYNECOLOGY

## 2023-09-06 NOTE — PROGRESS NOTES
S; Rola Miller is a 48 year old who presents for STD testing. She had a full annual exam several months ago but STD testing was not done.  She has a single partner who is also begin tested.  They have been using condoms.  She denies any concerns or symptoms for STD.    She also reports a long history of urinary incontinence.  By her description, she likely had urodynamic studies many years ago, but no plan was ever given to her for mgmt.  She report doing kegels periodically without improvement.    She describes her incontinence as just constantly leaking throughout the day.  She denies stress incontinence or urgency.      O: BP 99/64   Pulse 91   Wt 63.2 kg (139 lb 4.8 oz)   LMP 08/26/2023 (Exact Date)   SpO2 100%   BMI 21.82 kg/m      Psych: normal affect, appropriate eye contact  Resp: no increased work of breathing  CV: no peripheral edema  Abd; SNT, no palpable masses  Lymph: no enlarged inquinal nodes  Pelvic: normal vulva and vagina.  No obvious urethral abnormalities. Gc/ct swab taken  Skin: no visible rashes or lesions.    A/P:  1) STD screen   Gc/ct done.  To lab for HIV, RPR, hep C    2) Urinary incontinence   We discussed referral to urology and she had a lot of pain with the UDS and does not want to go through that again, is not really interested in urology referral at this time.  We discussed pelvic floor PT, which may or may not be successful given the lack of recent UDS, but certainly could try and referral given. She asked about medication and I let her know that if that is appropriate therapy, it would come for urology and am happy to give her referral at any time.  Questions answered    JOAQUIN KELLER MD

## 2023-10-14 ENCOUNTER — HEALTH MAINTENANCE LETTER (OUTPATIENT)
Age: 48
End: 2023-10-14

## 2023-12-04 ENCOUNTER — TRANSFERRED RECORDS (OUTPATIENT)
Dept: MULTI SPECIALTY CLINIC | Facility: CLINIC | Age: 48
End: 2023-12-04

## 2023-12-04 LAB
CHOLESTEROL (EXTERNAL): 208 MG/DL
HBA1C MFR BLD: 5.2 % (ref 4–5.6)
HDLC SERPL-MCNC: 76 MG/DL
LDL CHOLESTEROL CALCULATED (EXTERNAL): 121 MG/DL
NON HDL CHOLESTEROL (EXTERNAL): 132 MG/DL
TRIGLYCERIDES (EXTERNAL): 55 MG/DL

## 2024-01-18 ENCOUNTER — PATIENT OUTREACH (OUTPATIENT)
Dept: FAMILY MEDICINE | Facility: CLINIC | Age: 49
End: 2024-01-18
Payer: COMMERCIAL

## 2024-01-18 NOTE — TELEPHONE ENCOUNTER
Patient Quality Outreach    Patient is due for the following:   Colon Cancer Screening  Breast Cancer Screening - Mammogram      Topic Date Due    Hepatitis B Vaccine (1 of 3 - 3-dose series) Never done    Diptheria Tetanus Pertussis (DTAP/TDAP/TD) Vaccine (3 - Td or Tdap) 03/04/2023    Flu Vaccine (1) 09/01/2023    COVID-19 Vaccine (5 - 2023-24 season) 09/01/2023       Next Steps:   Schedule mammo & colonoscopy     Type of outreach:    Sent [employer] message.      Questions for provider review:    None           Dania Dsouza, CMA

## 2025-01-18 ENCOUNTER — HEALTH MAINTENANCE LETTER (OUTPATIENT)
Age: 50
End: 2025-01-18

## 2025-08-30 ENCOUNTER — HEALTH MAINTENANCE LETTER (OUTPATIENT)
Age: 50
End: 2025-08-30